# Patient Record
Sex: FEMALE | URBAN - METROPOLITAN AREA
[De-identification: names, ages, dates, MRNs, and addresses within clinical notes are randomized per-mention and may not be internally consistent; named-entity substitution may affect disease eponyms.]

---

## 2019-10-22 ENCOUNTER — APPOINTMENT (RX ONLY)
Dept: URBAN - METROPOLITAN AREA CLINIC 23 | Facility: CLINIC | Age: 44
Setting detail: DERMATOLOGY
End: 2019-10-22

## 2019-10-22 DIAGNOSIS — Z41.9 ENCOUNTER FOR PROCEDURE FOR PURPOSES OTHER THAN REMEDYING HEALTH STATE, UNSPECIFIED: ICD-10-CM

## 2019-10-22 PROCEDURE — ? FILLERS

## 2019-10-22 PROCEDURE — ? DYSPORT

## 2019-10-22 NOTE — PROCEDURE: MIPS QUALITY
Quality 130: Documentation Of Current Medications In The Medical Record: Current Medications Documented
Detail Level: Detailed
Additional Notes: 0/10 pain
Quality 131: Pain Assessment And Follow-Up: Pain assessment using a standardized tool is documented as negative, no follow-up plan required

## 2019-10-22 NOTE — PROCEDURE: FILLERS
Additional Area 2 Volume In Cc: 0
Additional Area 5 Location: Cheeks, vermillion lips, marionette fine lines, glabellar fine lines, lateral mouth
Price (Use Numbers Only, No Special Characters Or $): 0.00
Additional Area 2 Location: Nasalabial, Glabellar fine lines- Complimentary
Use Map Statement For Sites (Optional): No
Vermilion Lips Filler Volume In Cc: 0.5
Lot #: [id number]
Lot #: [id number]
Map Statment: See [address] for Complete Details
Expiration Date (Month Year): 2021-10-31
Expiration Date (Month Year): 09/2021
Anesthesia Type: 1% lidocaine without epinephrine
Lot #: [id number]
Include Cannula Size?: 25G
Expiration Date (Month Year): 08/31/2019
Additional Area 1 Location: cheeks
Additional Anesthesia Volume In Cc: 6
Include Cannula Length?: 1.5 inch
Additional Area 1 Location: lateral mouth, perioral fine lines, vermillion lips, marionette lines
Additional Area 2 Location: cheeks, jawline, oral commissure
Include Cannula Brand?: DermaSculpt
Consent: Written consent obtained. Risks include but not limited to bruising, beading, irregular texture, ulceration, infection, allergic reaction, scar formation, incomplete augmentation, temporary nature, procedural pain.
Additional Area 2 Location: Lips, oral commissure, glabellar fine fines
Additional Area 3 Location: Glabbellar fine lines, Nasalabial folds, Marionette lines, vermillion lip
Post-Care Instructions: Patient instructed to apply ice to reduce swelling.
Additional Area 1 Location: lateral mouth, fine lines perioral, marionette lines, lateral cheeks, vermillion lips
Filler: Restylane
Detail Level: Zone
Additional Area 4 Location: cheeks, lateral jawline, medial cheeks fine lines
Additional Area 4 Location: Perioral

## 2019-10-22 NOTE — PROCEDURE: DYSPORT
Additional Area 4 Location: 2cm high forehead
Additional Area 1 Units: [id number] [address]oso
Dilution (U/ 0.1cc): 1.5
Levator Labii Superioris Units: 0
Consent: Written consent obtained. Risks include but not limited to lid/brow ptosis, bruising, swelling, diplopia, temporary effect, incomplete chemical denervation.
Glabellar Complex Units: 30
Additional Area 3 Location: high forehead 3 cm above brows
Additional Area 5 Location: glabella
Expiration Date (Month Year): 11/30/2019
Additional Area 2 Location: lateral brows
Lot #: [id number]
Detail Level: Simple
Price (Use Numbers Only, No Special Characters Or $): 0.00
Additional Area 1 Location: forehead 3 cm above brow

## 2020-11-03 ENCOUNTER — APPOINTMENT (RX ONLY)
Dept: URBAN - METROPOLITAN AREA CLINIC 23 | Facility: CLINIC | Age: 45
Setting detail: DERMATOLOGY
End: 2020-11-03

## 2020-11-03 DIAGNOSIS — Z41.9 ENCOUNTER FOR PROCEDURE FOR PURPOSES OTHER THAN REMEDYING HEALTH STATE, UNSPECIFIED: ICD-10-CM

## 2020-11-03 PROCEDURE — ? FILLERS

## 2020-11-03 PROCEDURE — ? DYSPORT

## 2020-11-03 NOTE — PROCEDURE: DYSPORT
Additional Area 3 Location: high forehead 3 cm above brows
[facility] Units: 0
Show Periorbital Units: Yes
Additional Area 5 Location: glabella
Show Mentalis Units: No
Lot #: [id number]
Consent: Written consent obtained. Risks include but not limited to lid/brow ptosis, bruising, swelling, diplopia, temporary effect, incomplete chemical denervation.
Additional Area 1 Location: high forehead
Dilution (U/ 0.1cc): 1.5
Glabellar Complex Units: [id number] [address]ys Cardoso
Additional Area 2 Location: crows feet
Expiration Date (Month Year): 06/2021
Detail Level: Simple
Additional Area 4 Location: 2cm high forehead
Forehead Units: 30
Price (Use Numbers Only, No Special Characters Or $): 0.00

## 2021-02-04 ENCOUNTER — APPOINTMENT (RX ONLY)
Dept: URBAN - METROPOLITAN AREA CLINIC 23 | Facility: CLINIC | Age: 46
Setting detail: DERMATOLOGY
End: 2021-02-04

## 2021-02-04 DIAGNOSIS — Z02.9 ENCOUNTER FOR ADMINISTRATIVE EXAMINATIONS, UNSPECIFIED: ICD-10-CM

## 2021-02-04 PROCEDURE — ? NO SHOW PLAN

## 2021-08-17 ENCOUNTER — APPOINTMENT (RX ONLY)
Dept: URBAN - METROPOLITAN AREA CLINIC 23 | Facility: CLINIC | Age: 46
Setting detail: DERMATOLOGY
End: 2021-08-17

## 2021-08-17 DIAGNOSIS — Z41.9 ENCOUNTER FOR PROCEDURE FOR PURPOSES OTHER THAN REMEDYING HEALTH STATE, UNSPECIFIED: ICD-10-CM

## 2021-08-17 PROCEDURE — ? DYSPORT

## 2021-08-17 PROCEDURE — ? FILLERS

## 2021-08-17 NOTE — PROCEDURE: DYSPORT
Show Lcl Units: No
Topical Anesthesia?: 20% benzocaine, 8% lidocaine, 4% tetracaine
Nasal Root Units: 0
Lot #: [id number]
Show Additional Area 3: Yes
Additional Area 2 Units: [address]
Price (Use Numbers Only, No Special Characters Or $): 0.00
Additional Area 1 Location: high forehead
Dilution (U/ 0.1cc): 1.5
Additional Area 5 Location: glabella
Additional Area 3 Location: high forehead 3 cm above brows
Detail Level: Simple
Additional Area 1 Units: [id number] [facility]
Length Of Topical Anesthesia Application (Optional): 15 minutes
Consent: Written consent obtained. Risks include but not limited to lid/brow ptosis, bruising, swelling, diplopia, temporary effect, incomplete chemical denervation.
Expiration Date (Month Year): 04/30/22
Additional Area 4 Location: 2cm high forehead

## 2021-08-17 NOTE — PROCEDURE: FILLERS
Jawline Filler Volume In Cc: 0
Include Cannula Information In Note?: No
Additional Area 4 Location: lateral jawline, lateral mouth, glabella lines,
Additional Area 3 Location: Glabbellar fine lines, Nasalabial folds, Marionette lines, vermillion lip
Detail Level: Zone
Additional Area 1 Location: lateral mouth, fine lines perioral, marionette lines, lateral cheeks, vermillion lips
Additional Area 5 Location: Cheeks, vermillion lips, marionette fine lines, glabellar fine lines, lateral mouth
Filler: Restylane-L
Consent: Written consent obtained. Risks include but not limited to bruising, beading, irregular texture, ulceration, infection, allergic reaction, scar formation, incomplete augmentation, temporary nature, procedural pain.
Price (Use Numbers Only, No Special Characters Or $): 0.00
Additional Area 2 Location: Nasalabial, Glabellar fine lines- Complimentary
Additional Area 4 Location: Perioral
Lot #: [id number]
Post-Care Instructions: Patient instructed to apply ice to reduce swelling.
Map Statment: See [address] for Complete Details
Expiration Date (Month Year): 2024-01-31
Lot #: [id number]
Anesthesia Type: 1% lidocaine without epinephrine
Expiration Date (Month Year): 09/2021
Include Cannula Size?: 25G
Lot #: [id number]
Include Cannula Brand?: DermaSculpt
Include Cannula Length?: 1.5 inch
Additional Area 2 Location: cheeks, jawline, oral commissure
Expiration Date (Month Year): 08/31/2019
Additional Anesthesia Volume In Cc: 6
Additional Area 1 Location: lateral mouth, perioral fine lines, vermillion lips, marionette lines
Additional Area 1 Location: NLFâs, lips, lateral mouth, marionette lines
Additional Area 1 Volume In Cc: 0.5
Additional Area 2 Location: Lips, oral commissure, glabellar fine fines

## 2021-08-24 ENCOUNTER — APPOINTMENT (RX ONLY)
Dept: URBAN - METROPOLITAN AREA CLINIC 23 | Facility: CLINIC | Age: 46
Setting detail: DERMATOLOGY
End: 2021-08-24

## 2021-08-24 DIAGNOSIS — Z41.9 ENCOUNTER FOR PROCEDURE FOR PURPOSES OTHER THAN REMEDYING HEALTH STATE, UNSPECIFIED: ICD-10-CM

## 2021-08-24 PROCEDURE — ? COOLSCULPTING

## 2021-08-24 NOTE — PROCEDURE: COOLSCULPTING
Intro: Prior to treatment, the area was cleaned with alcohol and marked out with a marking pen. The gel sheet was then applied uniformly. The applicator was applied to the skin with good contact and suction.
Location 1: left arm
Suction Settings: The suction settings were per protocol.
Applicator Size: CoolAdvantage Curve
Time (Minutes - Will Only Render If Nonzero): [id number]
Detail Level: Zone
Post Treatment: After treatment, the suction was turned off, and the applicator was removed from the skin.
Time (Minutes - Will Only Render If Nonzero): 35
Device: Coolsculpting
Location 2: right arm
Applicator Size: CoolAdvantage Fit
Consent: Written consent obtained, risks reviewed including, but not limited to, blistering from suction, darker or lighter pigmentary change, bruising, and/or need for multiple treatments.